# Patient Record
Sex: MALE
[De-identification: names, ages, dates, MRNs, and addresses within clinical notes are randomized per-mention and may not be internally consistent; named-entity substitution may affect disease eponyms.]

---

## 2024-04-25 ENCOUNTER — APPOINTMENT (OUTPATIENT)
Dept: DERMATOLOGY | Facility: CLINIC | Age: 19
End: 2024-04-25

## 2024-05-20 ENCOUNTER — APPOINTMENT (OUTPATIENT)
Dept: DERMATOLOGY | Facility: CLINIC | Age: 19
End: 2024-05-20
Payer: COMMERCIAL

## 2024-05-20 VITALS — WEIGHT: 196 LBS | BODY MASS INDEX: 27.44 KG/M2 | HEIGHT: 71 IN

## 2024-05-20 DIAGNOSIS — L70.0 ACNE VULGARIS: ICD-10-CM

## 2024-05-20 DIAGNOSIS — L73.0 ACNE KELOID: ICD-10-CM

## 2024-05-20 DIAGNOSIS — L20.9 ATOPIC DERMATITIS, UNSPECIFIED: ICD-10-CM

## 2024-05-20 DIAGNOSIS — L85.3 XEROSIS CUTIS: ICD-10-CM

## 2024-05-20 PROBLEM — Z00.00 ENCOUNTER FOR PREVENTIVE HEALTH EXAMINATION: Status: ACTIVE | Noted: 2024-05-20

## 2024-05-20 PROCEDURE — 99204 OFFICE O/P NEW MOD 45 MIN: CPT

## 2024-05-20 RX ORDER — TRETINOIN 0.5 MG/G
0.05 CREAM TOPICAL
Qty: 1 | Refills: 5 | Status: ACTIVE | COMMUNITY
Start: 2024-05-20 | End: 1900-01-01

## 2024-05-20 RX ORDER — TACROLIMUS 1 MG/G
0.1 OINTMENT TOPICAL
Qty: 1 | Refills: 3 | Status: ACTIVE | COMMUNITY
Start: 2024-05-20 | End: 1900-01-01

## 2024-05-20 NOTE — HISTORY OF PRESENT ILLNESS
[FreeTextEntry1] : NV acne and eczema  [de-identified] : JANA HOLLINS is a 18 year old male presenting for:  1. acne on face, chest, and back for years. Using black soap, face mask, cream with SA. Concerned about his acne scars.  2. eczema on trunk and extremities since childhood. Using coconut oil daily. Used topical steroids in childhood.   Derm Hx: no hx skin cancer  Family Hx: no hx skin cancer

## 2024-05-20 NOTE — PHYSICAL EXAM
[FreeTextEntry3] : ice pick scars on cheeks bilaterally   one pink papule on left temple   reticulated, velvety brown plaques on AC fossae and posterior neck. Chest is clear.   diffuse xerosis

## 2024-05-20 NOTE — ASSESSMENT
[FreeTextEntry1] : #acne, mild inflammatory, primarily with #acne scarring, new diagnosis with uncertain prognosis  new diagnosis with uncertain prognosis  - START Tretinoin 0.05% crm at bedtime. Apply pea size amt spread thinly over entire face, 30min after washing face, every other night for 2 wks, then advance to qhs as tolerated. SED. - special education re: photosensitivity, dryness and skin peeling was provided  - use of noncomedogenic and oil free moisturizers and sunscreens reinforced - tretinoin may help mildly with acne scarring, but would need cosmetic treatment for more effective results;   #Atopic Dermatitis vs. CARP, new diagnosis with uncertain prognosis  AC fossae and posterior neck   Reviewed risks (as well as mitigation strategies for adverse drug events as applicable), benefits, and alternatives of therapy  - START tacrolimus 0.01% ointment to AA BID. SED. - if not improving, biopsy NV and consider starting minocycline for suspicion of CARP   #Xerosis - principles of dry skin care extensively reviewed including the importance of using an emollient at least once a day and avoiding fragranced products including soap and detergent  RTC 8 weeks

## 2024-08-06 ENCOUNTER — APPOINTMENT (OUTPATIENT)
Dept: DERMATOLOGY | Facility: CLINIC | Age: 19
End: 2024-08-06